# Patient Record
Sex: FEMALE | Race: WHITE | NOT HISPANIC OR LATINO | Employment: PART TIME | URBAN - METROPOLITAN AREA
[De-identification: names, ages, dates, MRNs, and addresses within clinical notes are randomized per-mention and may not be internally consistent; named-entity substitution may affect disease eponyms.]

---

## 2017-02-03 ENCOUNTER — OFFICE VISIT (OUTPATIENT)
Dept: OBSTETRICS AND GYNECOLOGY | Facility: CLINIC | Age: 42
End: 2017-02-03

## 2017-02-03 VITALS
SYSTOLIC BLOOD PRESSURE: 114 MMHG | WEIGHT: 154.4 LBS | DIASTOLIC BLOOD PRESSURE: 61 MMHG | BODY MASS INDEX: 23.4 KG/M2 | HEIGHT: 68 IN

## 2017-02-03 DIAGNOSIS — N76.0 ACUTE VAGINITIS: ICD-10-CM

## 2017-02-03 DIAGNOSIS — Z12.4 PAP SMEAR FOR CERVICAL CANCER SCREENING: Primary | ICD-10-CM

## 2017-02-03 DIAGNOSIS — Z30.431 ENCOUNTER FOR ROUTINE CHECKING OF INTRAUTERINE CONTRACEPTIVE DEVICE: ICD-10-CM

## 2017-02-03 DIAGNOSIS — Z01.419 WELL WOMAN EXAM WITH ROUTINE GYNECOLOGICAL EXAM: ICD-10-CM

## 2017-02-03 PROCEDURE — 99386 PREV VISIT NEW AGE 40-64: CPT | Performed by: OBSTETRICS & GYNECOLOGY

## 2017-02-03 RX ORDER — ERGOCALCIFEROL 1.25 MG/1
50000 CAPSULE ORAL
COMMUNITY
Start: 2016-12-22 | End: 2017-02-03

## 2017-02-03 RX ORDER — ERGOCALCIFEROL 1.25 MG/1
CAPSULE ORAL
Refills: 0 | COMMUNITY
Start: 2017-01-27 | End: 2018-02-20

## 2017-02-03 RX ORDER — METRONIDAZOLE 500 MG/1
500 TABLET ORAL 2 TIMES DAILY
Qty: 14 TABLET | Refills: 0 | Status: SHIPPED | OUTPATIENT
Start: 2017-02-03 | End: 2017-02-10

## 2017-02-03 NOTE — PROGRESS NOTES
Chief Complaint   Patient presents with   • Gynecologic Exam     discharge      Subjective   Sheyla Hopson is a 41 y.o. female presents today for annual gyn exam. Also concerned about method of continued contraception.  Has a Mirena: helps with cramps and flows (just dark); but causes complexion problems.  This is her 4th year of usage.      History of Present Illness: as above.    The following portions of the patient's history were reviewed and updated as appropriate: allergies, current medications, past family history, past medical history, past social history, past surgical history and problem list.    Review of Systems   Constitutional: Negative for fatigue and fever.   HENT: Negative for congestion.    Eyes: Negative for visual disturbance.   Respiratory: Negative for chest tightness and shortness of breath.    Cardiovascular: Negative for chest pain and palpitations.   Gastrointestinal: Negative for abdominal pain, blood in stool and nausea.   Endocrine: Negative for cold intolerance.   Genitourinary: Negative for difficulty urinating, dysuria, frequency, genital sores, hematuria, menstrual problem, pelvic pain, urgency, vaginal bleeding, vaginal discharge and vaginal pain.   Musculoskeletal: Negative for back pain.   Skin: Negative for color change and rash.   Neurological: Negative for headaches.   Psychiatric/Behavioral: Negative for sleep disturbance.     Past Medical History   Diagnosis Date   • Urinary tract infection      Past Surgical History   Procedure Laterality Date   • Breast augmentation Bilateral    • [place] tooth extraction       OB History      Para Term  AB TAB SAB Ectopic Multiple Living    4 3 3  1  1   2        Menstrual History:  OB History      Para Term  AB TAB SAB Ectopic Multiple Living    4 3 3  1  1   2         Menarche age: 10  Patient's last menstrual period was 2017.       Family History   Problem Relation Age of Onset   • No Known  "Problems Father    • No Known Problems Mother    • No Known Problems Sister    • No Known Problems Son    • No Known Problems Daughter      History   Smoking Status   • Current Every Day Smoker   • Packs/day: 0.50   • Types: Cigarettes   Smokeless Tobacco   • Never Used     History   Alcohol Use   • Yes     Comment: occ         Vitals:    02/03/17 0942   BP: 114/61   Weight: 154 lb 6.4 oz (70 kg)   Height: 68\" (172.7 cm)       Objective   Physical Exam   Constitutional: She is oriented to person, place, and time. She appears well-developed and well-nourished.   HENT:   Head: Normocephalic and atraumatic.   Right Ear: External ear normal.   Left Ear: External ear normal.   Nose: Nose normal.   Eyes: EOM are normal. Pupils are equal, round, and reactive to light.   Neck: Normal range of motion. Neck supple. No thyromegaly present.   Cardiovascular: Normal rate, regular rhythm and normal heart sounds.    No murmur heard.  Pulmonary/Chest: Effort normal and breath sounds normal. She has no wheezes. She has no rales. She exhibits no tenderness. Right breast exhibits no inverted nipple, no mass, no nipple discharge, no skin change and no tenderness. Left breast exhibits no inverted nipple, no mass, no nipple discharge, no skin change and no tenderness. There is no breast swelling.   Abdominal: Soft. Bowel sounds are normal. She exhibits no distension and no mass. There is no tenderness. Hernia confirmed negative in the right inguinal area and confirmed negative in the left inguinal area.   Genitourinary: Uterus normal. No breast tenderness. Pelvic exam was performed with patient supine. There is no rash, tenderness or lesion on the right labia. There is no rash, tenderness or lesion on the left labia. Cervix exhibits no motion tenderness, no discharge and no friability. Right adnexum displays no mass and no tenderness. Left adnexum displays no mass and no tenderness. No erythema, tenderness or bleeding in the vagina. " Vaginal discharge found.   Genitourinary Comments: Mirena strings OK   Musculoskeletal: Normal range of motion. She exhibits no edema.   Neurological: She is alert and oriented to person, place, and time.   Skin: Skin is warm and dry. No rash noted.   Psychiatric: She has a normal mood and affect. Judgment normal.   Nursing note and vitals reviewed.        Assessment/Plan   Sheyla was seen today for gynecologic exam.    Diagnoses and all orders for this visit:    Pap smear for cervical cancer screening  -     IGP, Rfx Aptima HPV ASCU    Well woman exam with routine gynecological exam: await culture and begin metronidazole pending results. Up to date on mammography.    Acute vaginitis  -     NuSwab BV & Candida, VIVIANA    Encounter for routine checking of intrauterine contraceptive device: long discussion regarding BC options.  Estrogen add-back with current Mirena; extended interval OC; use of Nuvaring with extended interval option; copper containing IUD, Essure.  Given info to review and she'll let us know.      Will call with culture results; await to hear from about about selection of contraception and cycle control.

## 2017-02-07 LAB
A VAGINAE DNA VAG QL NAA+PROBE: ABNORMAL SCORE
BVAB2 DNA VAG QL NAA+PROBE: ABNORMAL SCORE
C ALBICANS DNA VAG QL NAA+PROBE: NEGATIVE
C GLABRATA DNA VAG QL NAA+PROBE: NEGATIVE
MEGA1 DNA VAG QL NAA+PROBE: ABNORMAL SCORE

## 2017-02-08 LAB
CONV .: ABNORMAL
CYTOLOGIST CVX/VAG CYTO: ABNORMAL
CYTOLOGY CVX/VAG DOC THIN PREP: ABNORMAL
DX ICD CODE: ABNORMAL
DX ICD CODE: ABNORMAL
HIV 1 & 2 AB SER-IMP: ABNORMAL
HPV I/H RISK 4 DNA CVX QL PROBE+SIG AMP: NEGATIVE
OTHER STN SPEC: ABNORMAL
PATH REPORT.FINAL DX SPEC: ABNORMAL
PATHOLOGIST CVX/VAG CYTO: ABNORMAL
STAT OF ADQ CVX/VAG CYTO-IMP: ABNORMAL

## 2018-01-16 ENCOUNTER — TELEPHONE (OUTPATIENT)
Dept: OBSTETRICS AND GYNECOLOGY | Facility: CLINIC | Age: 43
End: 2018-01-16

## 2018-01-16 NOTE — TELEPHONE ENCOUNTER
Well, she would definitely need to come into the office to sign the form allowing us to order the Mirena.  If we have her accurate insurance information, I am comfortable with going ahead and ordering the Mirena to do a removal and reinsertion.  She would need to take some ibuprofen on the day that we would schedule her removal and reinsertion, about 30 minutes prior to her scheduled appointment.

## 2018-02-20 ENCOUNTER — PROCEDURE VISIT (OUTPATIENT)
Dept: OBSTETRICS AND GYNECOLOGY | Facility: CLINIC | Age: 43
End: 2018-02-20

## 2018-02-20 VITALS
HEART RATE: 80 BPM | SYSTOLIC BLOOD PRESSURE: 120 MMHG | BODY MASS INDEX: 20.92 KG/M2 | HEIGHT: 68 IN | DIASTOLIC BLOOD PRESSURE: 77 MMHG | WEIGHT: 138 LBS

## 2018-02-20 DIAGNOSIS — Z30.433 ENCOUNTER FOR IUD REMOVAL AND REINSERTION: Primary | ICD-10-CM

## 2018-02-20 DIAGNOSIS — N89.8 VAGINAL DISCHARGE: ICD-10-CM

## 2018-02-20 PROCEDURE — 58301 REMOVE INTRAUTERINE DEVICE: CPT | Performed by: OBSTETRICS & GYNECOLOGY

## 2018-02-20 PROCEDURE — 99213 OFFICE O/P EST LOW 20 MIN: CPT | Performed by: OBSTETRICS & GYNECOLOGY

## 2018-02-20 PROCEDURE — 58300 INSERT INTRAUTERINE DEVICE: CPT | Performed by: OBSTETRICS & GYNECOLOGY

## 2018-02-20 RX ORDER — NICOTINE 21 MG/24HR
PATCH, TRANSDERMAL 24 HOURS TRANSDERMAL
Refills: 0 | COMMUNITY
Start: 2017-12-29 | End: 2018-03-20

## 2018-02-20 NOTE — PROGRESS NOTES
Subjective   Sheyla Hopson is a 42 y.o. female   CC: Wants IUD removed and reinserted.  Having vaginal discharge    History of Present Illness  Patient is here primarily for Mirena IUD removal and reinsertion cervical and however, she also has concerns about vaginal discharge.  She was last seen in our office in 2/2017.  Pap smear at that time showed atypical squamous cells of undetermined significance, HPV was negative.  Patient at that time was concerned about acne from her Mirena IUD.  Dr. Antwan DEWITT discussed with the patient about starting on estrogen to help replace this.  She elected to forego the estrogen and just deal with the acne.  Overall she feels the acne has been improving during the duration of time that which she has had the IUD.  The patient has been seen her primary care physician.  She reports that she had a Pap smear there about 2 months ago.  She reports that she was diagnosed with a yeast infection a few months ago.  She continued to have symptoms of vaginal discharge and return to her primary care physician.  They treated her again for 3 days for a yeast infection, but she reports that her symptoms persist.  The patient is sexually active with 1 partner.  They do not use condoms.  Her partner had considered having a vasectomy performed, but has never done that so far.  The patient reports that prior to receiving the Mirena, her periods are very heavy and very painful.  These have been much improved on the Mirena; however, she does have about 2-3 days of light bleeding each month.  She would like to try to keep the Mirena and order to help with her periods.    The following portions of the patient's history were reviewed and updated as appropriate: allergies, current medications, past family history, past medical history, past social history, past surgical history and problem list.    Review of Systems  General: No fever or chills  Constitutional: No weight loss or gain, no hair loss  HENT:  "No headache, no hearing loss, no tinnitus  Eyes: normal vision, no eye pain  Lungs: No cough, no shortness of breath  Heart: No chest pain, no palpitations  Abdomen: No nausea, vomiting, constipation or diarrhea  : No dysuria, no hematuria  Skin: No rashes  Lymph: No swelling  Neuro: No parathesia, no weakness  Psych: Normal though content, no hallucinations, no SI/HI    Objective   Physical Exam  Vitals:    02/20/18 1512   BP: 120/77   Pulse: 80   Weight: 62.6 kg (138 lb)   Height: 172.7 cm (68\")   Gen: No acute distress, awake and oriented times three  Abdomen: soft, nontender, non distended, normoactive bowel sounds  Pelvic: Exam performed in the presence of a female chaperone  Patient has provided verbal consent to proceed with exam.  Normal external female genitalia, no lesions  Vagina: No blood; mild white dc  Cervix: No cervical motion tenderness, no lesions, no active bleeding, nonfriable, IUD strings seen  Uterus: Anteverted, normal size and shape, nontender  Adnexa: No masses or tenderness  Rectal: Deferred  Psych: Good judgement and insight, normal affect and mood      Assessment/Plan   Diagnoses and all orders for this visit:    Encounter for IUD removal and reinsertion    Vaginal discharge  -     NuSwab VG+ - Swab, Vagina      Mirena was removed in a new one was reinserted today.  See full procedure note for details.  Patient having recurrent vaginal discharge.  We will obtain cultures today.  We will treat as indicated.  I explained to the patient if she becomes very unhappy with side effects of the Mirena such as acne, she may return to our office for further evaluation for other potential treatments of her heavy periods and for contraception as well.  She verbalized understanding.    I spent 10 out of 15 minutes with the patient in face to face counseling of the above issues.             "

## 2018-02-20 NOTE — PROGRESS NOTES
Procedure   Procedures   pt here for IUD insertion.    Procedure: Mirena Intrauterine device removal and reinsertion  Preoperative diagnosis: Encounter for IUD removal and reinsertion  Postoperative diagnosis: Same  Indications: Patient presents today for removal and reinsertion of Mirena IUD.  She has had her current Mirena for about 5 years.  She reports heavy and painful periods before receiving the Mirena.  Since she has had the Mirena, her periods have been much improved.  She does still have about 3 days of bleeding each month, but is light in volume.  Her cramping is also improved.  She has had some concerns about side effects of acne in the past, but is willing to deal with this if her periods are improved.  The patient does have concerns with recurrent vaginal discharge today.  Please see office note for further details.  Findings: Anteverted, about six-week size  Anesthesia: None  Pathology: None  Estimated blood loss: Less than 5 mL  Complications: None    Procedure in detail:  Patient placed in lithotomy position in stirrups. [place] speculum placed into vagina. Cervix was visualized. IUD strings were seen at the external os. Strings were grasped with a ring forceps and the IUD was easily removed with slow, steady, gentle tension. IUD was removed intact and discarded. No complications. Patient tolerated the procedure well. The cervix was prepped with Betadine. The anterior lip was grasped with a single-tooth tenaculum. The endometrial cavity was then sounded to 6 cm without use of a dilator. Gloves were then exchanged for sterile gloves. This sealed Mirena package was opened and the Mirena was removed in a sterile fashion.  The upper edge of the depth setting the flange was set at a uterine sound measured. The  was then carefully advanced to the cervical canal into the uterus to the level of the fundus. This was then backed off about 1.5-2 cm to allow sufficient space for the arms to open. The  slider was then retracted about 1 cm and deployed the device. The device was then gently advanced to the fundus. The Mirena was then released by pulling the slider down all the way. The  was removed carefully from the uterus. The threads were then cut leaving 2-3 cm visible outside of the cervix.  The single-tooth tenaculum was removed from the anterior lip. Good hemostasis was noted. All other instruments were removed from the vagina. There were no complications, and the patient tolerated the procedure well with a minimal amount of discomfort. The patient was counseled about the need to return in 4 weeks for string check. She was counseled about the need to use a backup method of contraception such as condoms until her post insertion exam was performed. The patient verbalized understanding that the Mirena will need to be removed/replaced after 5 years. The patient is counseled to contact us if she has any significant or increasing bleeding, pain, fever, chills, or other concerns. She is instructed to see a doctor right away if she believes that she may be pregnant at any time with the Mirena in place.

## 2018-02-22 LAB
A VAGINAE DNA VAG QL NAA+PROBE: NORMAL SCORE
BVAB2 DNA VAG QL NAA+PROBE: NORMAL SCORE
C ALBICANS DNA VAG QL NAA+PROBE: NEGATIVE
C GLABRATA DNA VAG QL NAA+PROBE: NEGATIVE
C TRACH RRNA SPEC QL NAA+PROBE: NEGATIVE
MEGA1 DNA VAG QL NAA+PROBE: NORMAL SCORE
N GONORRHOEA RRNA SPEC QL NAA+PROBE: NEGATIVE
T VAGINALIS RRNA SPEC QL NAA+PROBE: NEGATIVE

## 2018-02-26 ENCOUNTER — TELEPHONE (OUTPATIENT)
Dept: OBSTETRICS AND GYNECOLOGY | Facility: CLINIC | Age: 43
End: 2018-02-26

## 2018-02-26 NOTE — TELEPHONE ENCOUNTER
----- Message from José Blevins MD sent at 2/24/2018  4:39 PM EST -----  Notify the patient that her vaginal cultures were negative.

## 2018-03-20 ENCOUNTER — OFFICE VISIT (OUTPATIENT)
Dept: OBSTETRICS AND GYNECOLOGY | Facility: CLINIC | Age: 43
End: 2018-03-20

## 2018-03-20 ENCOUNTER — PROCEDURE VISIT (OUTPATIENT)
Dept: OBSTETRICS AND GYNECOLOGY | Facility: CLINIC | Age: 43
End: 2018-03-20

## 2018-03-20 VITALS
HEIGHT: 68 IN | HEART RATE: 70 BPM | DIASTOLIC BLOOD PRESSURE: 72 MMHG | SYSTOLIC BLOOD PRESSURE: 106 MMHG | WEIGHT: 147 LBS | BODY MASS INDEX: 22.28 KG/M2

## 2018-03-20 DIAGNOSIS — T83.32XA MALPOSITIONED INTRAUTERINE DEVICE (IUD), INITIAL ENCOUNTER: Primary | ICD-10-CM

## 2018-03-20 DIAGNOSIS — Z30.431 IUD CHECK UP: Primary | ICD-10-CM

## 2018-03-20 PROCEDURE — 76830 TRANSVAGINAL US NON-OB: CPT | Performed by: OBSTETRICS & GYNECOLOGY

## 2018-03-20 PROCEDURE — 99213 OFFICE O/P EST LOW 20 MIN: CPT | Performed by: OBSTETRICS & GYNECOLOGY

## 2018-03-20 NOTE — PROGRESS NOTES
"Subjective   Sheyla Hopson is a 42 y.o. female.   CC: Pt here for f/u US IUD check.  History of Present Illness   Pt here for f/u US IUD check.  Patient reports that she did start to have a period within the last few days.  She reports it is difficult to the periods that she had with her previous IUD.  Is mostly dark brown and light in volume.  She denies any significant pelvic pain.  She does note some back pain over the last week, but she also reports that this started after a tennis match.  She is otherwise doing well.  She previously had some concerns about acne with the Mirena IUD, but she has not had any issues with acne recently.  She is otherwise doing well today and is in her usual state of health.    The following portions of the patient's history were reviewed and updated as appropriate: allergies, current medications, past family history, past medical history, past social history, past surgical history and problem list.    Review of Systems  General: No fever or chills  Abdomen: No nausea, vomiting, constipation or diarrhea  : No dysuria, no hematuria  Skin: No rashes  Lymph: No swelling  Neuro: No parathesia, no weakness  Psych: Normal though content, no hallucinations, no SI/HI    Objective   Physical Exam  Vitals:    03/20/18 1512   BP: 106/72   Pulse: 70   Weight: 66.7 kg (147 lb)   Height: 172.7 cm (68\")   Gen: No acute distress, awake and oriented times three  Pelvic: Deferred  Psych: Good judgement and insight, normal affect and mood    Ultrasound findings: Uterus normal appearing.  Endometrial lining 0.2 cm.  Ovaries normal appearing.  The IUD is present within the uterus near the lower uterine segment.  It is not within the cervix.      Assessment/Plan   Diagnoses and all orders for this visit:    IUD check up    Discussed ultrasound findings today.  We discussed issues related to efficacy of a progesterone IUD if it is malpositioned within the uterus.  I explained that there several " studies which show that there is no increased risk of pregnancy for progesterone IUD as long as is still within the uterus.  Most groups do not recommend routine removal of IUD IUDs malpositioned within the uterus unless are causing symptoms such as pain, irregular bleeding, etc.  At this point time I would recommend observation.  Patient agrees with the plan.  She will plan to return in about 1 year for annual exam.  She may return sooner as needed.    I spent 12 out of 15 minutes with the patient in face to face counseling of the above issues.

## 2019-02-22 ENCOUNTER — OFFICE VISIT (OUTPATIENT)
Dept: OBSTETRICS AND GYNECOLOGY | Facility: CLINIC | Age: 44
End: 2019-02-22

## 2019-02-22 VITALS
BODY MASS INDEX: 24.71 KG/M2 | WEIGHT: 163 LBS | DIASTOLIC BLOOD PRESSURE: 62 MMHG | SYSTOLIC BLOOD PRESSURE: 111 MMHG | HEART RATE: 79 BPM | HEIGHT: 68 IN

## 2019-02-22 DIAGNOSIS — Z30.432 ENCOUNTER FOR IUD REMOVAL: ICD-10-CM

## 2019-02-22 DIAGNOSIS — T83.32XS MALPOSITIONED IUD, SEQUELA: ICD-10-CM

## 2019-02-22 DIAGNOSIS — N89.8 VAGINAL DISCHARGE: Primary | ICD-10-CM

## 2019-02-22 PROBLEM — Z30.431 IUD CHECK UP: Status: RESOLVED | Noted: 2018-03-20 | Resolved: 2019-02-22

## 2019-02-22 PROBLEM — Z30.433 ENCOUNTER FOR IUD REMOVAL AND REINSERTION: Status: RESOLVED | Noted: 2018-02-20 | Resolved: 2019-02-22

## 2019-02-22 PROCEDURE — 58301 REMOVE INTRAUTERINE DEVICE: CPT | Performed by: OBSTETRICS & GYNECOLOGY

## 2019-02-22 PROCEDURE — 99213 OFFICE O/P EST LOW 20 MIN: CPT | Performed by: OBSTETRICS & GYNECOLOGY

## 2019-02-22 RX ORDER — IBUPROFEN 400 MG/1
400 TABLET ORAL
COMMUNITY

## 2019-02-22 NOTE — PROGRESS NOTES
"Subjective   Sheyla Hopson is a 43 y.o. female.   CC: pt here for IUD check and possible infection.   History of Present Illness   Patient is here with concerns of recurrent vaginal discharge and odor since she had her IUD inserted about 1 year ago.  Initially, the IUD was noted to be low within the cervix, but as she was not having problems at the time she elected to keep the IUD.  Patient does have some concerns that she possibly may have a retained tampon for the last few weeks.  Patient states that she is celibate and has no plans for sexual activity anytime in the near future.  She would like to have her IUD removed today, and she does not feel that she requires additional contraception.  She is otherwise in her usual state of health.  She denies fevers and chills.  No nausea and vomiting.  No significant lower abdominal pain.    Current Outpatient Medications on File Prior to Visit   Medication Sig   • ibuprofen (ADVIL,MOTRIN) 400 MG tablet Take 400 mg by mouth.     No current facility-administered medications on file prior to visit.      Allergies   Allergen Reactions   • Sulfa Antibiotics Nausea And Vomiting       The following portions of the patient's history were reviewed and updated as appropriate: allergies, current medications, past family history, past medical history, past social history, past surgical history and problem list.    Review of Systems  General: No fever or chills  Constitutional: No weight loss or gain, no hair loss  Abdomen: No nausea, vomiting, constipation or diarrhea  : No dysuria, no hematuria  Skin: No rashes  Psych: Normal though content, no hallucinations, no SI/HI    Objective   Physical Exam  Vitals:    02/22/19 1145   BP: 111/62   Pulse: 79   Weight: 73.9 kg (163 lb)   Height: 172.7 cm (68\")     Gen: No acute distress, awake and oriented times three  Abdomen: soft, nontender, no masses or hernia, no hepatosplenomegaly, non distended, normoactive bowel sounds  Pelvic: " Exam performed in the presence of a female chaperone  Patient has provided verbal consent to proceed with exam.  Normal external female genitalia, no lesions  Urethra: Normal meatus, no caruncle  Bladder: nontender  Vagina: No blood; scant amount of white discharge is noted.  There is no retained tampon within the vaginal vault  Cervix: No cervical motion tenderness, no lesions, no active bleeding, nonfriable, IUD strings noted at the cervix.   Bimanual: Deferred  Psych: Good judgement and insight, normal affect and mood      Assessment/Plan   Diagnoses and all orders for this visit:    Vaginal discharge  -     NuSwab VG+ - Swab, Vagina  Patient feels that she has frequent vaginal discharge and odor since she has had the IUD inserted one year ago.  She had some concerns that she may have a tampon in place, but there is currently no retained tampon today.  There is a minimal amount of physiologic appearing discharge on exam, but I am not certain that there is any pathologic process going on.  Cultures were obtained and we can treat as indicated.  Patient states that she would like to have her IUD removed today to see if the discharge and odor improve after IUD removal.  She reports that she is currently celibate and does not have any immediate plans to resume sexual activity.  She does not want alternative contraception at this time.  Malpositioned IUD, sequela    Encounter for IUD removal  See procedure note.  Patient declines alternative contraception today.  Advised the patient that if she feels she is about to become sexually active she can come in for contraceptive counseling.  She also has the options of barrier method such as condoms and Plan B.

## 2019-02-25 NOTE — PROGRESS NOTES
Procedure: Mirena Intrauterine device removal  Preoperative diagnosis:   1.  43-year-old  4 para 3 with vaginal discharge  2.  Irregular bleeding  3.  Malpositioned IUD  Postoperative diagnosis: Same  Indications: See office note from today for details.  Anesthesia: None  Pathology: None  Estimated blood loss: Less than 5 mL  Complications: None    Procedure in detail:  Patient placed in lithotomy position in stirrups. [place] speculum placed into vagina. Cervix was visualized. IUD strings were seen at the external os. Strings were grasped with a ring forceps and the IUD was easily removed with slow, steady, gentle tension. IUD was removed intact and discarded. No complications. Patient tolerated the procedure well. She was instructed to use condoms as a backup method for the first month if not attempting to conceive.

## 2019-02-27 ENCOUNTER — TELEPHONE (OUTPATIENT)
Dept: OBSTETRICS AND GYNECOLOGY | Facility: CLINIC | Age: 44
End: 2019-02-27

## 2019-02-27 NOTE — TELEPHONE ENCOUNTER
Left message to call office. TATYANA    ----- Message from José Blevins MD sent at 2/26/2019 10:33 AM EST -----  Notify the patient that her cultures were normal

## 2021-04-02 ENCOUNTER — BULK ORDERING (OUTPATIENT)
Dept: CASE MANAGEMENT | Facility: OTHER | Age: 46
End: 2021-04-02

## 2021-04-02 DIAGNOSIS — Z23 IMMUNIZATION DUE: ICD-10-CM
